# Patient Record
Sex: FEMALE | Race: WHITE | NOT HISPANIC OR LATINO | Employment: OTHER | URBAN - METROPOLITAN AREA
[De-identification: names, ages, dates, MRNs, and addresses within clinical notes are randomized per-mention and may not be internally consistent; named-entity substitution may affect disease eponyms.]

---

## 2017-02-13 ENCOUNTER — ALLSCRIPTS OFFICE VISIT (OUTPATIENT)
Dept: OTHER | Facility: OTHER | Age: 82
End: 2017-02-13

## 2017-02-13 LAB
FLUAV AG SPEC QL IA: POSITIVE
INFLUENZA B AG (HISTORICAL): NEGATIVE

## 2017-04-11 ENCOUNTER — ALLSCRIPTS OFFICE VISIT (OUTPATIENT)
Dept: OTHER | Facility: OTHER | Age: 82
End: 2017-04-11

## 2018-01-13 NOTE — PROGRESS NOTES
Assessment    1  Medicare annual wellness visit, subsequent (V70 0) (Z00 00)   2  Osteoporosis (733 00) (M81 0)   3  Need for Tdap vaccination (V06 1) (Z23)    Plan  Health Maintenance    · *VB - Fall Risk Assessment  (Dx Z13 89 Screen for Neurologic Disorder);  Status:Complete;   Done: 51AOV2025 02:50PM   · *VB - Urinary Incontinence Screen (Dx Z13 89 Screen for UI); Status:Complete;   Done:  97XUT4216 02:50PM  Medicare annual wellness visit, subsequent    · Eat a low fat and low cholesterol diet ; Status:Complete;   Done: 02SYZ5160   · Keep a diary of when and what you eat ; Status:Complete;   Done: 28Oct2016   · There are many exercise options for seniors ; Status:Complete;   Done: 28Oct2016   · These are things you can do to prevent falls in and around the home ; Status:Complete;    Done: 28Oct2016  Need for Tdap vaccination    · Boostrix 5-2 5-18 5 Intramuscular Suspension (Boostrix 5-2 5-18 5 Intramuscular  Suspension)   · Boostrix 5-2 5-18 5 Intramuscular Suspension (Boostrix 5-2 5-18 5 Intramuscular  Suspension); INJECT 0 5  ML Intramuscular    Discussion/Summary    AWV completed  Tdap ordered to get at pharmacy  She declined treatment for her osteoporosis  Impression: Subsequent Annual Wellness Visit, with preventive exam as well as age and risk appropriate counseling completed  Cardiovascular screening and counseling: the risks and benefits of screening were discussed and screening is current  Diabetes screening and counseling: the risks and benefits of screening were discussed and screening is current  Colorectal cancer screening and counseling: the risks and benefits of screening were discussed and screening is current  Breast cancer screening and counseling: the risks and benefits of screening were discussed and screening is current  Cervical cancer screening and counseling: the risks and benefits of screening were discussed and screening is current     Osteoporosis screening and counseling: the risks and benefits of screening were discussed and screening is current  Abdominal aortic aneurysm screening and counseling: screening not indicated  Glaucoma screening and counseling: the risks and benefits of screening were discussed and screening is current  HIV screening and counseling: the patient declines screening and screening not indicated  Immunizations: the risks and benefits of influenza vaccination were discussed with the patient, influenza vaccine is up to date this year, the risks and benefits of pneumococcal vaccination were discussed with the patient, the lifetime pneumococcal vaccine has been completed, the risks and benefits of the Zostavax vaccine were discussed with the patient, Zostavax vaccination up to date, the risks and benefits of the Tdap vaccine were discussed with the patient and Tdap vaccine needed today  Advance Directive Planning: complete and up to date, paperwork and instructions were given to the patient  Patient Discussion: plan discussed with the patient, follow-up visit needed in 6 months   Chief Complaint  Patient is here for Medicare wellness  History of Present Illness  Welcome to Medicare and Wellness Visits: The patient is being seen for the subsequent annual wellness visit  Medicare Screening and Risk Factors   Hospitalizations: no previous hospitalizations  Medicare Screening Tests Risk Questions   Abdominal aortic aneurysm risk assessment: none indicated  Osteoporosis risk assessment: , female gender and over 48years of age  HIV risk assessment: none indicated  Drug and Alcohol Use: The patient has never smoked cigarettes and has never used smokeless tobacco  The patient reports never drinking alcohol  Alcohol concern:   The patient has no concerns about alcohol abuse  She has never used illicit drugs     Diet and Physical Activity: Current diet includes well balanced meals, 2 servings of fruit per day, 3 servings of vegetables per day, 1 servings of meat per day, 1 servings of whole grains per day and 1 cups of coffee per day  She exercises infrequently  Exercise: stretching, strength training 90 minutes per week  Mood Disorder and Cognitive Impairment Screening: She denies feeling down, depressed, or hopeless over the past two weeks  She denies feeling little interest or pleasure in doing things over the past two weeks  Cognitive impairment screening: denies difficulty learning/retaining new information, denies difficulty handling complex tasks, denies difficulty with reasoning, denies difficulty with spatial ability and orientation, denies difficulty with language and denies difficulty with behavior  Functional Ability/Level of Safety: Hearing is slightly decreased, slightly decreased in the right ear, slightly decreased in the left ear and a hearing aid is not used  She reports hearing difficulties  The patient is currently able to participate in social activities with limitations, but able to do activities of daily living without limitations and able to drive without limitations  Activities of daily living details: does not need help using the phone, no transportation help needed, does not need help shopping, no meal preparation help needed, does not need help doing housework, does not need help doing laundry, does not need help managing medications and does not need help managing money  Fall risk factors:  antihypertensive use, but The patient fell 0 times in the past 12 months  and no previous fall  Home safety risk factors:  no grab bars in the bathroom, but no unfamiliar surroundings, no loose rugs, no poor household lighting and no household clutter  Advance Directives: Advance directives: living will and pt is safe at home and nobody is hurting her     Co-Managers and Medical Equipment/Suppliers: See Patient Care Team      Patient Care Team    Care Team Member Role Specialty Office Number   [place], Nurse Schedule   [phone]   Jason Paz MD  Orthopedic Surgery [phone] [address]  Nurse Practitioner [phone]   Elsa Sanon MD  Vascular Surgery [phone]   Kimberly Marcus MD  Obstetrics/Gynecology [phone]     Review of Systems    Constitutional: negative  Eyes: negative  ENT: negative  Cardiovascular: negative  Respiratory: negative  Gastrointestinal: negative  Genitourinary: negative  Musculoskeletal: negative  Integumentary and Breasts: negative  Neurological: negative  Psychiatric: negative  Endocrine: negative  Hematologic and Lymphatic: negative  Over the past 2 weeks, how often have you been bothered by the following problems? 1 ) Little interest or pleasure in doing things? Not at all    2 ) Feeling down, depressed or hopeless? Not at all    3 ) Trouble falling asleep or sleeping too much? Not at all    4 ) Feeling tired or having little energy? Not at all    5 ) Poor appetite or overeating? Not at all    6 ) Feeling bad about yourself, or that you are a failure, or have let yourself or your family down? Not at all    7 ) Trouble concentrating on things, such as reading a newspaper or watching television? Not at all    8 ) Moving or speaking so slowly that other people could have noticed, or the opposite, moving or speaking faster than usual? Not at all    9 ) Thoughts that you would be better off dead or of hurting yourself in some way? Not at all  Score 0      Active Problems     1  Abnormal LFTs (790 6) (R79 89)   2  Abnormal mammogram (793 80) (R92 8)   3  Arthritis of big toe (716 97) (M19 90)   4  Carotid artery stenosis (433 10) (I65 29)   5  Depression screening negative   6  Encounter for routine gynecological examination (V72 31) (Z01 419)   7  Esophageal reflux (530 81) (K21 9)   8  Hyperlipidemia (272 4) (E78 5)   9  Hypertension (401 9) (I10)   10  Hypothyroidism (244 9) (E03 9)   11   Insomnia (780 52) (G47 00) 12  Interstitial cystitis (595 1) (N30 10)   13  IT band syndrome, right (728 89) (M76 31)   14  Knee pain (719 46) (M25 569)   15  Left lumbar radiculitis (724 4) (M54 16)   16  Muscle cramping (729 82) (R25 2)   17  Need for pneumococcal vaccination (V03 82) (Z23)   18  Osteoarthritis of knee, unilateral (715 16) (M17 9)   19  Osteopenia (733 90) (M85 80)   20  Pain in joint of left hip (719 45) (M25 552)   21  Pain in joint of right hip (719 45) (M25 551)   22  Pain in joint of right shoulder region (719 41) (M25 511)   23  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)   24  Reported Hx Of Hip Replacement   25  Right renal mass (593 9) (N28 89)   26  Screening for osteoporosis (V82 81) (Z13 820)   27  Trigger index finger of left hand (727 03) (M65 322)   28  Trigger ring finger of right hand (727 03) (M65 341)   29  Trigger thumb of left hand (727 03) (M65 312)   30   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)    Encounter for screening mammogram for malignant neoplasm of breast (V76 12) (Z12 31)          Past Medical History    · History of Bursitis of right hip (726 5) (M70 71)   · History of Diverticulosis (562 10) (K57 90)   · History of  3 (V22 2) (Z33 1)   · History of abdominal pain (V13 89) (P92 485)   · History of breast lump (V13 89) (C36 652)   · History of gastroesophageal reflux (GERD) (V12 79) (Z87 19)   · History of hiatal hernia (V12 79) (Z87 19)   · History of hypercholesterolemia (V12 29) (Z86 39)   · History of hyperlipidemia (V12 29) (Z86 39)   · History of hypertension (V12 59) (Z86 79)   · History of hypertension (V12 59) (Z86 79)   · History of hypothyroidism (V12 29) (Z86 39)   · History of hypothyroidism (V12 29) (Z86 39)   · Muscle cramping (729 82) (R25 2)   · History of Need for Tdap vaccination (V06 1) (Z23)   · History of Pain in joint of left hip (719 45) (M25 552)   · History of Pneumonia (V12 61)   · Vaginal delivery (V13 29) (Z87 42)    The active problems and past medical history were reviewed and updated today  Surgical History    · History of Breast Surgery Lumpectomy   · History of Complete Colonoscopy   · History of Dilation And Curettage   · History of Ear Surgery   · History of Hysteroscopy   · Reported Hx Of Hip Replacement   · History of Stapedectomy   · History of Thyroid Surgery Sub-Total Thyroidectomy    The surgical history was reviewed and updated today  Family History  Mother    · No pertinent family history  Family History    · Family history of Anxiety and depression   · Family history of Arthritis (V17 7)   · Family history of Arthritis   · Family history of Cancer   · Family history of Heart disease   · Family history of Hypertension   · Family history of Osteoporosis (V17 81)   · Family history of Prostate Cancer (V16 42)   · Family history of Thyroid disorder    Social History    · Being A Social Drinker   · Daily Coffee Consumption (___ Cups/Day)   · Daily Tea Consumption (___ Cups/Day)   · Never A Smoker   · Denied: History of Tobacco Use  The social history was reviewed and updated today  Current Meds   1  Align Oral Capsule; Therapy: (Recorded:07Apr2014) to Recorded   2  Amitriptyline HCl - 10 MG Oral Tablet; take 1 tablet at bedtime; Therapy: 50CQA7728 to (Evaluate:73Bme2077)  Requested for: 34AYY6553; Last   Rx:06Mro3070 Ordered   3  Aspirin 325 MG Oral Tablet Recorded   4  Centrum Silver Oral Tablet; Therapy: (Recorded:16Oct2015) to Recorded   5  Lansoprazole 30 MG Oral Capsule Delayed Release; take 1 capsule daily; Therapy: 10Xze7142 to (Last DB:85NGG0324)  Requested for: 29YHN3445 Ordered   6  Levothyroxine Sodium 50 MCG Oral Tablet; TAKE 1 TABLET EVERY OTHER DAY; Therapy: 61PXL7231 to (Evaluate:10Oct2016); Last Rx:16Oct2015 Ordered   7  Levothyroxine Sodium 75 MCG Oral Tablet; TAKE 1 TABLET EVERY OTHER DAY; Therapy: 80Bax2655 to (Evaluate:10Oct2016)  Requested for: 62JDA7443; Last   Rx:16Oct2015 Ordered   8   Magnesium 500 MG Oral Capsule Recorded   9  MetroNIDAZOLE 1 % External Gel; Therapy: 25Pdg8896 to (Evaluate:02Dai1639) Recorded   10  Rosuvastatin Calcium 10 MG Oral Tablet; Therapy: (Recorded:56Xzg3662) to Recorded   11  Valsartan-Hydrochlorothiazide 160-12 5 MG Oral Tablet; TAKE 1 TABLET DAILY; Last    Rx:21Nkk7310 Ordered    The medication list was reviewed and updated today  Allergies    1  Crestor TABS   2  Inderal TABS   3  Actonel TABS   4  Cipro TABS   5  Tricor TABS   6  Vytorin TABS   7  Zetia TABS    Immunizations   1 2 3 4    Influenza  27-Sep-2013 10-Sep-2014 10-Sep-2014 11-Sep-2015    PCV  16-Oct-2015       PPSV  01-Nov-2004       Zoster  [id number]        Vitals  Signs    Systolic: 518  Diastolic: 76  Heart Rate: 84  Temperature: 99 F  O2 Saturation: 97  Height: 5 ft   Weight: 141 lb 2 08 oz  BMI Calculated: 27 56  BSA Calculated: 1 6    Physical Exam    Constitutional   General appearance: No acute distress, well appearing and well nourished  Head and Face   Head and face: Normal     Eyes   Conjunctiva and lids: No swelling, erythema or discharge  Pupils and irises: Equal, round, reactive to light  Ears, Nose, Mouth, and Throat   Otoscopic examination: Tympanic membranes translucent with normal light reflex  Canals patent without erythema  Hearing: Normal     Lips, teeth, and gums: Normal, good dentition  Oropharynx: Normal with no erythema, edema, exudate or lesions  Neck   Neck: Supple, symmetric, trachea midline, no masses  Pulmonary   Respiratory effort: No increased work of breathing or signs of respiratory distress  Auscultation of lungs: Clear to auscultation  Cardiovascular   Auscultation of heart: Normal rate and rhythm, normal S1 and S2, no murmurs  Carotid pulses: 2+ bilaterally  Examination of extremities for edema and/or varicosities: Normal     Abdomen   Abdomen: Non-tender, no masses  Lymphatic   Palpation of lymph nodes in neck: No lymphadenopathy  Musculoskeletal   Gait and station: Normal     Skin   Skin and subcutaneous tissue: Normal without rashes or lesions  Psychiatric   Judgment and insight: Normal     Orientation to person, place, and time: Normal     Recent and remote memory: Intact  Mood and affect: Normal        Results/Data  *VB - Urinary Incontinence Screen (Dx Z13 89 Screen for UI) 28Oct2016 02:50PM [employer]     Test Name Result Flag Reference   Urinary Incontinence Assessment 28Oct2016       *VB - Fall Risk Assessment  (Dx Z13 89 Screen for Neurologic Disorder) 75PDN6756 02:50PM [employer]     Test Name Result Flag Reference   Falls Risk      No falls in the past year     PHQ-9 Adult Depression Screening 28Oct2016 02:48PM Vin Purcell     Test Name Result Flag Reference   PHQ-9 Adult Depression Score 0     Over the last two weeks, how often have you been bothered by any of the following problems? Little interest or pleasure in doing things: Not at all - 0  Feeling down, depressed, or hopeless: Not at all - 0  Trouble falling or staying asleep, or sleeping too much: Not at all - 0  Feeling tired or having little energy: Not at all - 0  Poor appetite or over eating: Not at all - 0  Feeling bad about yourself - or that you are a failure or have let yourself or your family down: Not at all - 0  Trouble concentrating on things, such as reading the newspaper or watching television: Not at all - 0  Moving or speaking so slowly that other people could have noticed   Or the opposite -  being so fidgety or restless that you have been moving around a lot more than usual: Not at all - 0  Thoughts that you would be better off dead, or of hurting yourself in some way: Not at all - 0   PHQ-9 Adult Depression Screening Negative     PHQ-9 Difficulty Level Not difficult at all     PHQ-9 Severity No Depression       (1) TSH 28Oct2016 02:44PM [employer]     Test Name Result Flag Reference   TSH 0 779         Signatures   Electronically signed by Alie Montes MD; Oct 28 2016  3:27PM EST                       (Author)

## 2018-01-14 VITALS
SYSTOLIC BLOOD PRESSURE: 132 MMHG | TEMPERATURE: 98.7 F | OXYGEN SATURATION: 99 % | HEIGHT: 60 IN | DIASTOLIC BLOOD PRESSURE: 82 MMHG | WEIGHT: 130.13 LBS | BODY MASS INDEX: 25.55 KG/M2 | HEART RATE: 80 BPM

## 2018-01-14 VITALS
OXYGEN SATURATION: 97 % | BODY MASS INDEX: 26.39 KG/M2 | HEART RATE: 82 BPM | SYSTOLIC BLOOD PRESSURE: 138 MMHG | HEIGHT: 60 IN | DIASTOLIC BLOOD PRESSURE: 80 MMHG | TEMPERATURE: 99.3 F | WEIGHT: 134.4 LBS

## 2018-01-18 NOTE — RESULT NOTES
Message   diagnostic mammo was benign  can return to yearly screening     Verified Results  MAMMO DIAGNOSTIC RIGHT W CAD 27Jun2016 09:56AM Ashutosh Leggett     Test Name Result Flag Reference   [facility] DIAGNOSTIC RIGHT W CAD (Report)     Patient History:   Patient is postmenopausal and has history of other cancer  Family history of prostate cancer in father at age 72, prostate    cancer in father at age 80, premenopausal breast cancer in    maternal cousin under age 48, and premenopausal endometrial    cancer in maternal cousin under age 48    2 benign excisional biopsies of both breasts  Took hormonal contraceptives for 3 months  Patient has never smoked  Patient's BMI is 28 1  Reason for exam: follow-up at short interval from prior study  Mammo Diagnostic Right W CAD: June 27, 2016 - Check In #: [de-identified]   CC, MLO, XCCL, ML, spot compression CC, and spot compression MLO    view(s) were taken of the right breast      Technologist: Aditya Montano   Prior study comparison: December 17, 2015, right breast    unilateral diagnostic mammogram performed at [address] [place] [address]  December 10, 2015, digital bilateral screening    mammogram, performed at [facility]  The breast tissue is heterogeneously dense, potentially limiting    the sensitivity of mammography  Patient risk, included in this    report, assists in determining the appropriate screening regimen    (such as 3-D mammography or the inclusion of automated breast    ultrasound or MRI)  3-D mammography may also remain indicated as    screening  Parenchymal pattern is stable  Scattered benign    calcifications are again seen  Inner lower quadrant asymmetric    fibroglandular tissue remain stable  No suspicious masses or    microcalcifications are seen  Ultrasound images the right breast were obtained and area of    previously questioned abnormality   This again has appearance of    a focal island of fibroglandular tissue  No suspicious lesions    are seen  US Breast Right Limited: June 27, 2016 - Check In #: [de-identified]   Technologist: Abdiel Singletary RDMS     ASSESSMENT: BiRad:2 - Benign (Overall)     Recommendation:   Return to routine screening mammogram schedule  Analyzed by CAD     Transcription Location: [address]   Signing Station: [id number]     Risk Value(s):   Tyrer-Cuzick 10 Year: 0 406%, Tyrer-Cuzick Lifetime: 0 406%,    Myriad Table: 2 6%, OLI 5 Year: 2 0%, NCI Lifetime: 2 3%     *US BREAST RIGHT LIMITED (DIAGNOSTIC) 27Jun2016 09:56AM Geary Holter     Test Name Result Flag Reference   US BREAST RIGHT LIMITED (Report)     Patient History:   Patient is postmenopausal and has history of other cancer  Family history of prostate cancer in father at age 72, prostate    cancer in father at age 80, premenopausal breast cancer in    maternal cousin under age 48, and premenopausal endometrial    cancer in maternal cousin under age 48    2 benign excisional biopsies of both breasts  Took hormonal contraceptives for 3 months  Patient has never smoked  Patient's BMI is 28 1  Reason for exam: follow-up at short interval from prior study  Mammo Diagnostic Right W CAD: June 27, 2016 - Check In #: [de-identified]   CC, MLO, XCCL, ML, spot compression CC, and spot compression MLO    view(s) were taken of the right breast      Technologist: Lisa Anderson   Prior study comparison: December 17, 2015, right breast    unilateral diagnostic mammogram performed at [address] [place] [address]  December 10, 2015, digital bilateral screening    mammogram, performed at [address] [place] [address]  The breast tissue is heterogeneously dense, potentially limiting    the sensitivity of mammography  Patient risk, included in this    report, assists in determining the appropriate screening regimen    (such as 3-D mammography or the inclusion of automated breast    ultrasound or MRI)   3-D mammography may also remain indicated as    screening  Parenchymal pattern is stable  Scattered benign    calcifications are again seen  Inner lower quadrant asymmetric    fibroglandular tissue remain stable  No suspicious masses or    microcalcifications are seen  Ultrasound images the right breast were obtained and area of    previously questioned abnormality  This again has appearance of    a focal island of fibroglandular tissue  No suspicious lesions    are seen  US Breast Right Limited: June 27, 2016 - Check In #: [de-identified]   Technologist: Nataly Carlson RDMS     ASSESSMENT: BiRad:2 - Benign (Overall)     Recommendation:   Return to routine screening mammogram schedule     Analyzed by CAD     Transcription Location: [id number]   Signing Station: [id number]     Risk Value(s):   Tyrer-Cuzick 10 Year: 0 406%, Tyrer-Cuzick Lifetime: 0 406%,    Myriad Table: 2 6%, OLI 5 Year: 2 0%, NCI Lifetime: 2 3%